# Patient Record
Sex: MALE | Race: WHITE
[De-identification: names, ages, dates, MRNs, and addresses within clinical notes are randomized per-mention and may not be internally consistent; named-entity substitution may affect disease eponyms.]

---

## 2021-01-20 ENCOUNTER — HOSPITAL ENCOUNTER (OUTPATIENT)
Dept: HOSPITAL 75 - RAD | Age: 66
End: 2021-01-20
Attending: INTERNAL MEDICINE
Payer: MEDICARE

## 2021-01-20 DIAGNOSIS — D35.00: ICD-10-CM

## 2021-01-20 DIAGNOSIS — R91.1: ICD-10-CM

## 2021-01-20 DIAGNOSIS — F17.210: ICD-10-CM

## 2021-01-20 DIAGNOSIS — Z12.2: Primary | ICD-10-CM

## 2021-01-20 PROCEDURE — 71271 CT THORAX LUNG CANCER SCR C-: CPT

## 2021-01-20 NOTE — DIAGNOSTIC IMAGING REPORT
EXAMINATION:

CT Lung Screening.



INDICATION:

52 pack year smoking history. Low dose CT screening.



TECHNIQUE: 

Noncontrast, low-dose CT imaging performed according to the lung

cancer screening protocol. Auto Exposure Controls were utilize

during the CT exam to meet ALARA standards for radiation dose

reduction.



COMPARISON:

Baseline.



FINDINGS:

There is a mild degree of air trapping, most pronounced in the

pulmonary apices. There is no effusion or pneumothorax. No

findings of lymphadenopathy. The thoracic aorta is nonaneurysmal.

Image 159 series 2 shows a small right lower lobe pulmonary

nodule of 5 to 6 mm with some adjacent air trapping. Along its

margins, there is some very mild vague groundglass density. Just

anteriorly, there is a juxtapleural nodule in the right lower

lobe measuring 1.2 x 0.6 cm which is more likely partial

atelectasis. Some discoid subsegmental atelectasis in the right

middle lobe is present. There is some mild subpleural scarring

and peripheral groundglass opacity in the lateral margin of the

right middle lobe. The visualized upper abdomen shows a right

adrenal nodule measuring at least 3.6 x 2.6 cm extending below

the field of view. It has areas of negative Hounsfield units,

consistent with a fat-containing benign nodule and presumed

adenomatous. No acute upper abdominal abnormality.



IMPRESSION:

Small subcentimeter right lower lobe nodule with additional areas

of likely nodular atelectasis. While the findings favor

benignity, a followup study in 6 month is recommended.



LUNG-RADS CATEGORY:Lung RADS category 3

MODIFIER:None

OTHER SIGNIFICANT FINDINGS:Fat-containing benign adrenal adenoma

partially visualized. Mild heterogeneous air trapping in the

apices.



Dictated by: 



  Dictated on workstation # [id number]